# Patient Record
Sex: FEMALE
[De-identification: names, ages, dates, MRNs, and addresses within clinical notes are randomized per-mention and may not be internally consistent; named-entity substitution may affect disease eponyms.]

---

## 2018-07-30 ENCOUNTER — HOSPITAL ENCOUNTER (OUTPATIENT)
Dept: HOSPITAL 5 - XRAY | Age: 53
Discharge: HOME | End: 2018-07-30
Attending: INTERNAL MEDICINE
Payer: COMMERCIAL

## 2018-07-30 DIAGNOSIS — F17.210: ICD-10-CM

## 2018-07-30 DIAGNOSIS — M46.86: ICD-10-CM

## 2018-07-30 DIAGNOSIS — M43.12: Primary | ICD-10-CM

## 2018-07-30 DIAGNOSIS — M47.897: ICD-10-CM

## 2018-07-30 DIAGNOSIS — G43.909: ICD-10-CM

## 2018-07-30 LAB
ALBUMIN SERPL-MCNC: 3.9 G/DL (ref 3.9–5)
ALT SERPL-CCNC: 28 UNITS/L (ref 7–56)
BUN SERPL-MCNC: 20 MG/DL (ref 7–17)
BUN/CREAT SERPL: 33 %
CALCIUM SERPL-MCNC: 9.2 MG/DL (ref 8.4–10.2)
HEMOLYSIS INDEX: 5

## 2018-07-30 PROCEDURE — 72100 X-RAY EXAM L-S SPINE 2/3 VWS: CPT

## 2018-07-30 PROCEDURE — 36415 COLL VENOUS BLD VENIPUNCTURE: CPT

## 2018-07-30 PROCEDURE — 72040 X-RAY EXAM NECK SPINE 2-3 VW: CPT

## 2018-07-30 PROCEDURE — 80053 COMPREHEN METABOLIC PANEL: CPT

## 2018-07-30 NOTE — XRAY REPORT
FINAL REPORT



PROCEDURE:  XR SPINE CERVICAL 2-3V



TECHNIQUE:  Cervical spine radiographs, AP, lateral, and

open-mouth odontoid views. CPT 38772







HISTORY:  HEPATITIS C, MIGRAINES, NECK PAIN 



COMPARISON:  No prior studies are available for comparison.



FINDINGS:  

Prevertebral soft tissues: Normal .



Alignment: Mild degree anterolisthesis is noted at C4-5 and C5-6

measuring about 4 millimeters..



Vertebral body heights/Disk spaces: Normal .



Fracture(s): None .



Facets: Facet arthropathy is noted from C2-3 to C5-6 levels..



Bone mineralization: Normal .







IMPRESSION:  

Multilevel cervical spondylosis with mild degree

spondylolisthesis at C4-5 and C5-6

## 2018-07-30 NOTE — XRAY REPORT
FINAL REPORT



PROCEDURE:  XR HIP 2-3V RT



TECHNIQUE:  RIGHT hip radiographs, 2 views each, including AP

view of the pelvis. 



HISTORY:  HEPATITIS C, MIGRAINES, RIGHT HIP PAIN 



COMPARISON:  No prior studies are available for comparison.



FINDINGS:  

Fracture (s) and/or Dislocation(s): None .



Joint space(s): Bilateral hip joints are unremarkable. Bilateral

sacroiliac joints are also within normal limits. There is

evidence of left facet arthropathy at L3-4.



Soft tissues: Normal. 



Bone mineralization: Normal. 



Foreign bodies: None. 



IMPRESSION:  

No acute fracture.



Left facet arthropathy at L3-4

## 2018-07-30 NOTE — XRAY REPORT
FINAL REPORT



PROCEDURE:  XR SPINE LUMBOSACRAL 2-3V



TECHNIQUE:  Lumbar spine radiographs, including AP, lateral, and

lumbosacral spot views. CPT 94745 







HISTORY:  LOWER BACK PAIN 



COMPARISON:  No prior studies are available for comparison.



FINDINGS:  

Limited study due to suboptimal positioning. Alignment: Normal.



Vertebral body heights/Disk spaces: There is a mild degree wedge

configuration of T12 and L1 vertebral bodies. There narrowing of

the L5-S1 disc space..



Fracture(s): None.



Facets: Bilateral facet arthropathy is noted at L3-4 and L4-5..



Bone mineralization: Normal.



IMPRESSION:  

Mild degree wedge configuration of T12 and L1 vertebral bodies is

most likely a normal variation. MRI may be recommended to rule

out any acute fracture component.



Bilateral facet arthropathy at L3-4 and L4-5.



Degenerative disc disease at L5-S1..